# Patient Record
Sex: MALE | Race: WHITE | Employment: OTHER | URBAN - METROPOLITAN AREA
[De-identification: names, ages, dates, MRNs, and addresses within clinical notes are randomized per-mention and may not be internally consistent; named-entity substitution may affect disease eponyms.]

---

## 2023-08-30 LAB
BUN / CREAT RATIO: NORMAL
BUN BLDV-MCNC: 25 MG/DL
CREAT SERPL-MCNC: 1.13 MG/DL
PROSTATE SPECIFIC ANTIGEN: 2.52 NG/ML

## 2023-10-11 ENCOUNTER — OFFICE VISIT (OUTPATIENT)
Age: 83
End: 2023-10-11
Payer: MEDICARE

## 2023-10-11 DIAGNOSIS — N40.1 BENIGN PROSTATIC HYPERPLASIA WITH URINARY OBSTRUCTION: ICD-10-CM

## 2023-10-11 DIAGNOSIS — N13.8 BENIGN PROSTATIC HYPERPLASIA WITH URINARY OBSTRUCTION: ICD-10-CM

## 2023-10-11 DIAGNOSIS — N52.8 OTHER MALE ERECTILE DYSFUNCTION: ICD-10-CM

## 2023-10-11 DIAGNOSIS — R97.20 ELEVATED PSA: Primary | ICD-10-CM

## 2023-10-11 LAB
BILIRUBIN, POC: NORMAL
BLOOD URINE, POC: NORMAL
CLARITY, POC: CLEAR
COLOR, POC: YELLOW
GLUCOSE URINE, POC: NORMAL
KETONES, POC: NORMAL
LEUKOCYTE EST, POC: NORMAL
NITRITE, POC: NORMAL
PH, POC: NORMAL
PROTEIN, POC: NORMAL
SPECIFIC GRAVITY, POC: NORMAL
UROBILINOGEN, POC: NORMAL

## 2023-10-11 PROCEDURE — 99214 OFFICE O/P EST MOD 30 MIN: CPT | Performed by: SPECIALIST

## 2023-10-11 PROCEDURE — 81003 URINALYSIS AUTO W/O SCOPE: CPT | Performed by: SPECIALIST

## 2023-10-11 PROCEDURE — 1123F ACP DISCUSS/DSCN MKR DOCD: CPT | Performed by: SPECIALIST

## 2023-10-11 RX ORDER — FINASTERIDE 5 MG/1
5 TABLET, FILM COATED ORAL DAILY
Qty: 90 TABLET | Refills: 3 | Status: SHIPPED | OUTPATIENT
Start: 2023-10-11

## 2023-10-11 RX ORDER — SERTRALINE HYDROCHLORIDE 25 MG/1
TABLET, FILM COATED ORAL
COMMUNITY
Start: 2023-08-02

## 2023-10-11 NOTE — PROGRESS NOTES
[facility], [address] [place] [address] Urology Office Progress Note    Patient:  Chago Juárez  YOB: 1940  Date: 10/11/2023    HISTORY OF PRESENT ILLNESS:   The patient is a 79 y.o. male  Stable elevated PSA with normal JOE; no biopsy required. Of note, the patient's PSA of 2.52 is really 5.04 correcting for the finasteride effect. Patient's lower urinary tract symptoms are stable on Finasteride 5 mg po qd for BPH symptoms.      Lower urinary tract symptoms: decreased urinary stream and nocturia, 1-2 times per night   Last AUA Symptom Score (QOL): 5 (1)  Today's AUA Symptom Score (QOL): 7 (2)    Summary of old records:   ED: no oral meds worked; using vacuum, generic sildenafil 20 mg (1-5 tabs) prn ED; inactive (wife with Lyme disease)   BPH on finasteride 5 mg qd  Elevated PSA : bx 4/23/07 neg (75.86)     Additional History: none    Procedures Today: N/A    Urinalysis today:  Results for POC orders placed in visit on 10/11/23   POCT Urinalysis No Micro (Auto)   Result Value Ref Range    Color, UA yellow     Clarity, UA clear     Glucose, UA POC neg     Bilirubin, UA      Ketones, UA      Spec Grav, UA      Blood, UA POC neg     pH, UA      Protein, UA POC neg     Urobilinogen, UA      Leukocytes, UA neg     Nitrite, UA neg        Last several PSA's:  Lab Results   Component Value Date    PSA 2.52 08/30/2023    PSA 2.91 08/17/2022    PSA 2.59 08/12/2021       Last total testosterone:  No results found for: \"TESTOSTERONE\"    Last BUN and creatinine:  Lab Results   Component Value Date    BUN 25 08/30/2023     Lab Results   Component Value Date    CREATININE 1.13 08/30/2023       Last CBC:  Lab Results   Component Value Date    WBC 5.8 03/13/2012    HGB 13.2 (L) 03/13/2012    HCT 38.0 (L) 03/13/2012    MCV 93.0 03/13/2012     03/13/2012       Additional Lab/Culture results: none    Imaging Reviewed during this Office Visit: none  (results were independently reviewed by

## 2024-02-20 RX ORDER — FINASTERIDE 5 MG/1
TABLET, FILM COATED ORAL
Qty: 90 TABLET | Refills: 2 | Status: SHIPPED | OUTPATIENT
Start: 2024-02-20

## 2024-09-24 RX ORDER — FINASTERIDE 5 MG/1
5 TABLET, FILM COATED ORAL DAILY
Qty: 90 TABLET | Refills: 0 | Status: SHIPPED | OUTPATIENT
Start: 2024-09-24

## 2024-10-09 DIAGNOSIS — N52.8 OTHER MALE ERECTILE DYSFUNCTION: ICD-10-CM

## 2024-10-09 DIAGNOSIS — N13.8 BENIGN PROSTATIC HYPERPLASIA WITH URINARY OBSTRUCTION: ICD-10-CM

## 2024-10-09 DIAGNOSIS — N40.1 BENIGN PROSTATIC HYPERPLASIA WITH URINARY OBSTRUCTION: ICD-10-CM

## 2024-10-09 DIAGNOSIS — R97.20 ELEVATED PSA: Primary | ICD-10-CM

## 2024-10-09 LAB — PROSTATE SPECIFIC ANTIGEN: 4.12 NG/ML

## 2024-10-16 ENCOUNTER — OFFICE VISIT (OUTPATIENT)
Age: 84
End: 2024-10-16
Payer: MEDICARE

## 2024-10-16 VITALS — HEIGHT: 73 IN | WEIGHT: 182 LBS | BODY MASS INDEX: 24.12 KG/M2

## 2024-10-16 DIAGNOSIS — R97.20 ELEVATED PSA: Primary | ICD-10-CM

## 2024-10-16 DIAGNOSIS — N40.1 BENIGN PROSTATIC HYPERPLASIA WITH URINARY OBSTRUCTION: ICD-10-CM

## 2024-10-16 DIAGNOSIS — N13.8 BENIGN PROSTATIC HYPERPLASIA WITH URINARY OBSTRUCTION: ICD-10-CM

## 2024-10-16 DIAGNOSIS — N52.8 OTHER MALE ERECTILE DYSFUNCTION: ICD-10-CM

## 2024-10-16 LAB
BILIRUBIN, POC: NORMAL
BLOOD URINE, POC: NORMAL
CLARITY, POC: CLEAR
COLOR, POC: YELLOW
GLUCOSE URINE, POC: NORMAL MG/DL
KETONES, POC: NORMAL
LEUKOCYTE EST, POC: NORMAL
NITRITE, POC: NORMAL
PH, POC: NORMAL
PROTEIN, POC: NORMAL MG/DL
SPECIFIC GRAVITY, POC: NORMAL
UROBILINOGEN, POC: NORMAL

## 2024-10-16 PROCEDURE — 1123F ACP DISCUSS/DSCN MKR DOCD: CPT | Performed by: SPECIALIST

## 2024-10-16 PROCEDURE — 81003 URINALYSIS AUTO W/O SCOPE: CPT | Performed by: SPECIALIST

## 2024-10-16 PROCEDURE — 99214 OFFICE O/P EST MOD 30 MIN: CPT | Performed by: SPECIALIST

## 2024-10-16 RX ORDER — FINASTERIDE 5 MG/1
5 TABLET, FILM COATED ORAL DAILY
Qty: 90 TABLET | Refills: 3 | Status: SHIPPED | OUTPATIENT
Start: 2024-10-16

## 2024-10-16 NOTE — PROGRESS NOTES
Bruce eLvy MD [facility] Urology Office Progress Note    Patient:  Alo Roca  YOB: 1940  Date: 10/16/2024    HISTORY OF PRESENT ILLNESS:   The patient is a 84 y.o. male  Patient's lower urinary tract symptoms are stable on Finasteride 5 mg po qd for BPH symptoms.   The patient's is up to 4.12 (which is really 8.24 correcting for finasteride effect) from 2.52 last year.  Today's JOE does show a subtle nodule at the right base.  Given his age, I don't think aggressive workup is indicated.  Will repeat a PSA in 6 months and then 1 year.  If PSA continues to rise a lot, will consider a prostate MRI to evaluate for clinically significant prostate cancer.     Lower urinary tract symptoms: frequency, hesitancy, decreased urinary stream, and nocturia, 1 times per night   Last AUA Symptom Score (QOL): 7 (2)  Today's AUA Symptom Score (QOL): 4 (1)    Summary of old records:   ED: no oral meds worked; using vacuum, generic sildenafil 20 mg (1-5 tabs) prn ED; inactive (wife with Lyme disease)   BPH on finasteride 5 mg qd  Elevated PSA : bx 4/23/07 neg (75.86); R base nodule 10/16/24    Additional History: none    Procedures Today: N/A    Urinalysis today:  Results for orders placed or performed in visit on 10/16/24   POCT Urinalysis No Micro (Auto)   Result Value Ref Range    Color, UA yellow     Clarity, UA clear     Glucose, UA POC neg mg/dL    Bilirubin, UA      Ketones, UA      Spec Grav, UA      Blood, UA POC neg     pH, UA      Protein, UA POC neg mg/dL    Urobilinogen, UA      Leukocytes, UA neg     Nitrite, UA neg        Last several PSA's:  Lab Results   Component Value Date    PSA 4.12 10/09/2024    PSA 2.52 08/30/2023    PSA 2.91 08/17/2022       Last total testosterone:  No results found for: \"TESTOSTERONE\"    Last BUN and creatinine:  Lab Results   Component Value Date    BUN 25 08/30/2023     Lab Results   Component Value Date    CREATININE 1.13 08/30/2023

## 2024-11-20 RX ORDER — FINASTERIDE 5 MG/1
5 TABLET, FILM COATED ORAL DAILY
Qty: 90 TABLET | Refills: 3 | Status: SHIPPED | OUTPATIENT
Start: 2024-11-20

## 2024-11-20 NOTE — TELEPHONE ENCOUNTER
Prescription was sent to wrong pharmacy, called in requesting it be sent to [facility].  Last seen 10/16/24. Due 10/16/25.

## 2025-04-16 LAB — PROSTATE SPECIFIC ANTIGEN: 3.44 NG/ML

## 2025-04-17 DIAGNOSIS — N40.1 BENIGN PROSTATIC HYPERPLASIA WITH URINARY OBSTRUCTION: ICD-10-CM

## 2025-04-17 DIAGNOSIS — N52.8 OTHER MALE ERECTILE DYSFUNCTION: ICD-10-CM

## 2025-04-17 DIAGNOSIS — R97.20 ELEVATED PSA: ICD-10-CM

## 2025-04-17 DIAGNOSIS — N13.8 BENIGN PROSTATIC HYPERPLASIA WITH URINARY OBSTRUCTION: ICD-10-CM

## 2025-04-18 ENCOUNTER — RESULTS FOLLOW-UP (OUTPATIENT)
Age: 85
End: 2025-04-18